# Patient Record
Sex: MALE | Race: WHITE
[De-identification: names, ages, dates, MRNs, and addresses within clinical notes are randomized per-mention and may not be internally consistent; named-entity substitution may affect disease eponyms.]

---

## 2020-08-16 ENCOUNTER — HOSPITAL ENCOUNTER (EMERGENCY)
Dept: HOSPITAL 50 - VM.ED | Age: 30
Discharge: HOME | End: 2020-08-16
Payer: COMMERCIAL

## 2020-08-16 DIAGNOSIS — F17.210: ICD-10-CM

## 2020-08-16 DIAGNOSIS — Z91.09: ICD-10-CM

## 2020-08-16 DIAGNOSIS — I10: ICD-10-CM

## 2020-08-16 DIAGNOSIS — K42.9: Primary | ICD-10-CM

## 2020-08-16 DIAGNOSIS — Z79.899: ICD-10-CM

## 2020-08-16 LAB
ANION GAP SERPL CALC-SCNC: 12.6 MMOL/L (ref 10–20)
CHLORIDE SERPL-SCNC: 104 MMOL/L (ref 98–107)
SODIUM SERPL-SCNC: 139 MMOL/L (ref 136–145)

## 2020-08-16 NOTE — EDM.PDOC
ED HPI GENERAL MEDICAL PROBLEM





- General


Chief Complaint: Abdominal Pain


Stated Complaint: STOMACH PAIN


Time Seen by Provider: 08/16/20 19:30


Source of Information: Reports: Patient


History Limitations: Reports: No Limitations





- History of Present Illness


INITIAL COMMENTS - FREE TEXT/NARRATIVE: 





Patient comes emergency department today from home with complaints of abdominal 

pain.  Since about 1:00 today the patient has had severe pain in the right side 

of his abdomen and his umbilical area.  He does have a known umbilical hernia 

that regularly protrudes.  Today when he was trying to get up from the chair he 

suddenly had quite a bit more pain in his abdomen and is unrelenting and has not

stopped.  No nausea or vomiting.  No diarrhea.  No hematuria dysuria or urinary 

frequency.  No fever no chills.  No COVID symptoms or cold exposure.


  ** Right Upper Abdominal


Pain Score (Numeric/FACES): 6





- Related Data


                                    Allergies











Allergy/AdvReac Type Severity Reaction Status Date / Time


 


cat dander Allergy  Rash Verified 08/16/20 19:50











Home Meds: 


                                    Home Meds





Cyclobenzaprine [Flexeril] 10 mg PO TID PRN 08/16/20 [History]


DULoxetine [Cymbalta] 60 mg PO DAILY 08/16/20 [History]


Losartan [Cozaar] 50 mg PO DAILY 08/16/20 [History]


busPIRone HCl [Buspirone HCl] 15 mg PO TID PRN 08/16/20 [History]











Past Medical History


Cardiovascular History: Reports: Hypertension


Gastrointestinal History: Reports: Other (See Below)


Other Gastrointestinal History: hernia to abdomen





Social & Family History





- Tobacco Use


Smoking Status *Q: Current Every Day Smoker


Years of Tobacco use: 10


Packs/Tins Daily: 1





- Recreational Drug Use


Recreational Drug Use: No





ED ROS GENERAL





- Review of Systems


Review Of Systems: Comprehensive ROS is negative, except as noted in HPI.





ED EXAM, GI/ABD





- Physical Exam


Exam: See Below


Exam Limited By: No Limitations


General Appearance: Alert, WD/WN, Moderate Distress


Eyes: Bilateral: EOMI


Ears: Normal External Exam


Nose: Normal Inspection


Throat/Mouth: Normal Inspection


Head: Atraumatic, Normocephalic


Neck: Normal Inspection


Respiratory/Chest: No Respiratory Distress, Lungs Clear, Normal Breath Sounds, 

No Accessory Muscle Use, Chest Non-Tender


Cardiovascular: Normal Peripheral Pulses, Regular Rate, Rhythm


GI/Abdominal Exam: Normal Bowel Sounds, Soft, Tender (He has generalized 

referred tenderness throughout the abdomen to his umbilicus region on 

palpation.), Hernia (He has exquisite point tenderness and a small soft 

umbilical hernia.  It is very tender on palpation.  Really does not feel like an

 incarcerated hernia with how soft it is but he is very tender in this region.  

There is no other hernias of the abdomen.)


 (Male) Exam: Deferred


Rectal (Males) Exam: Deferred


Back Exam: Normal Inspection, Full Range of Motion


Extremities: Normal Inspection, Normal Range of Motion


Neurological: Alert, Oriented, Normal Cognition, No Motor/Sensory Deficits


Psychiatric: Normal Affect, Normal Mood


Skin Exam: Warm, Dry, Intact, Normal Color





Course





- Vital Signs


Last Recorded V/S: 


                                Last Vital Signs











Temp  98.4 F   08/16/20 19:15


 


Pulse  88   08/16/20 20:04


 


Resp  16   08/16/20 19:15


 


BP  177/100 H  08/16/20 20:04


 


Pulse Ox  100   08/16/20 19:15














- Orders/Labs/Meds


Orders: 


                               Active Orders 24 hr











 Category Date Time Status


 


 Abdomen Pelvis w Cont [CT] Stat Exams  08/16/20 20:40 Taken


 


 Sodium Chloride 0.9% [Saline Flush] Med  08/16/20 20:03 Active





 10 ml FLUSH ASDIRECTED PRN   


 


 Peripheral IV Insertion Adult [OM.PC] Stat Oth  08/16/20 20:03 Ordered








                                Medication Orders





Sodium Chloride (Saline Flush)  10 ml FLUSH ASDIRECTED PRN


   PRN Reason: Keep Vein Open








Labs: 


                                Laboratory Tests











  08/16/20 08/16/20 08/16/20 Range/Units





  19:40 19:40 19:40 


 


WBC  11.8 H    (4.0-10.0)  x10^3/uL


 


RBC  5.30    (4.5-6.0)  x10^6/uL


 


Hgb  15.3    (14.0-18.0)  g/dL


 


Hct  43.4    (40.0-52.0)  %


 


MCV  81.9    (78.0-93.0)  fL


 


MCH  28.9    (26.0-32.0)  pg


 


MCHC  35.3    (32.0-36.0)  g/dL


 


RDW Coeff of Krystal  12.9    (10.0-15.0)  %


 


Plt Count  241    (130-400)  x10^3/uL


 


Neut % (Auto)  57.6    (50.0-80.0)  %


 


Lymph % (Auto)  29.1    (25.0-50.0)  %


 


Mono % (Auto)  9.6    (2.0-11.0)  %


 


Eos % (Auto)  3.1    (0.0-4.0)  %


 


Baso % (Auto)  0.6    (0.2-1.2)  %


 


Sodium   139   (136-145)  mmol/L


 


Potassium   3.6   (3.5-5.1)  mmol/L


 


Chloride   104   ()  mmol/L


 


Carbon Dioxide   26   (21-32)  mmol/L


 


Anion Gap   12.6   (10-20)  mmol/L


 


BUN   13   (7-18)  mg/dL


 


Creatinine   1.2   (0.70-1.30)  mg/dL


 


Est Cr Clr Drug Dosing   TNP   


 


Estimated GFR (MDRD)   > 60   


 


Glucose   142 H   ()  mg/dL


 


Lactic Acid    1.8  (0.4-2.0)  mmol/L


 


Calcium   9.0   (8.5-10.1)  mg/dL


 


Corrected Calcium   8.84   (8.5-10.1)  mg/dL


 


Total Bilirubin   0.6   (0.2-1.0)  mg/dL


 


AST   22   (15-37)  U/L


 


ALT   76 H   (16-63)  U/L


 


Alkaline Phosphatase   101   ()  U/L


 


C-Reactive Protein   0.5   (<=0.9)  mg/dL


 


Total Protein   7.4   (6.4-8.2)  g/dL


 


Albumin   4.2   (3.4-5.0)  g/dL


 


Globulin   3.2   


 


Albumin/Globulin Ratio   1.31   


 


Urine Color     (YELLOW)  


 


Urine Appearance     (CLEAR)  


 


Urine pH     (5.0-8.0)  


 


Ur Specific Gravity     


 


Urine Protein     (NEGATIVE)  mg/dL


 


Urine Glucose (UA)     (NEGATIVE)  mg/dL


 


Urine Ketones     (NEGATIVE)  mg/dL


 


Urine Occult Blood     (NEGATIVE)  


 


Urine Nitrite     (NEGATIVE)  


 


Urine Bilirubin     (NEGATIVE)  


 


Urine Urobilinogen     (0.2)  EU/dL


 


Ur Leukocyte Esterase     (NEGATIVE)  


 


Urine RBC     (NOT SEEN)  /HPF


 


Urine WBC     (NOT SEEN)  /HPF


 


Ur Squamous Epith Cells     (NEGATIVE)  /HPF


 


Urine Bacteria     (NEGATIVE)  /HPF


 


Urine Mucus     (NEGATIVE)  /LPF














  08/16/20 Range/Units





  19:50 


 


WBC   (4.0-10.0)  x10^3/uL


 


RBC   (4.5-6.0)  x10^6/uL


 


Hgb   (14.0-18.0)  g/dL


 


Hct   (40.0-52.0)  %


 


MCV   (78.0-93.0)  fL


 


MCH   (26.0-32.0)  pg


 


MCHC   (32.0-36.0)  g/dL


 


RDW Coeff of Krystal   (10.0-15.0)  %


 


Plt Count   (130-400)  x10^3/uL


 


Neut % (Auto)   (50.0-80.0)  %


 


Lymph % (Auto)   (25.0-50.0)  %


 


Mono % (Auto)   (2.0-11.0)  %


 


Eos % (Auto)   (0.0-4.0)  %


 


Baso % (Auto)   (0.2-1.2)  %


 


Sodium   (136-145)  mmol/L


 


Potassium   (3.5-5.1)  mmol/L


 


Chloride   ()  mmol/L


 


Carbon Dioxide   (21-32)  mmol/L


 


Anion Gap   (10-20)  mmol/L


 


BUN   (7-18)  mg/dL


 


Creatinine   (0.70-1.30)  mg/dL


 


Est Cr Clr Drug Dosing   


 


Estimated GFR (MDRD)   


 


Glucose   ()  mg/dL


 


Lactic Acid   (0.4-2.0)  mmol/L


 


Calcium   (8.5-10.1)  mg/dL


 


Corrected Calcium   (8.5-10.1)  mg/dL


 


Total Bilirubin   (0.2-1.0)  mg/dL


 


AST   (15-37)  U/L


 


ALT   (16-63)  U/L


 


Alkaline Phosphatase   ()  U/L


 


C-Reactive Protein   (<=0.9)  mg/dL


 


Total Protein   (6.4-8.2)  g/dL


 


Albumin   (3.4-5.0)  g/dL


 


Globulin   


 


Albumin/Globulin Ratio   


 


Urine Color  Dark yellow H  (YELLOW)  


 


Urine Appearance  Slightly cloudy H  (CLEAR)  


 


Urine pH  6.0  (5.0-8.0)  


 


Ur Specific Gravity  >=1.030  


 


Urine Protein  Trace H  (NEGATIVE)  mg/dL


 


Urine Glucose (UA)  Negative  (NEGATIVE)  mg/dL


 


Urine Ketones  Negative  (NEGATIVE)  mg/dL


 


Urine Occult Blood  Negative  (NEGATIVE)  


 


Urine Nitrite  Negative  (NEGATIVE)  


 


Urine Bilirubin  Negative  (NEGATIVE)  


 


Urine Urobilinogen  0.2  (0.2)  EU/dL


 


Ur Leukocyte Esterase  Negative  (NEGATIVE)  


 


Urine RBC  0-5  (NOT SEEN)  /HPF


 


Urine WBC  0-5  (NOT SEEN)  /HPF


 


Ur Squamous Epith Cells  Not seen  (NEGATIVE)  /HPF


 


Urine Bacteria  Rare  (NEGATIVE)  /HPF


 


Urine Mucus  Few H  (NEGATIVE)  /LPF











Meds: 


Medications











Generic Name Dose Route Start Last Admin





  Trade Name Freq  PRN Reason Stop Dose Admin


 


Sodium Chloride  10 ml  08/16/20 20:03 





  Saline Flush  FLUSH  





  ASDIRECTED PRN  





  Keep Vein Open  














Discontinued Medications














Generic Name Dose Route Start Last Admin





  Trade Name Freq  PRN Reason Stop Dose Admin


 


Hydrocodone Bitart/Acetaminophen  1 packet  08/16/20 22:39 





  Take Home: Acetam/Hydrocodon 325-5 Mg, 5 Pack  PO  08/16/20 22:40 





  ONETIME ONE  


 


Diphenhydramine HCl  25 mg  08/16/20 20:02  08/16/20 20:12





  Benadryl  IVPUSH  08/16/20 20:03  25 mg





  ONETIME ONE   Administration


 


Fentanyl  100 mcg  08/16/20 20:02  08/16/20 20:12





  Sublimaze  IVPUSH  08/16/20 20:03  100 mcg





  ONETIME ONE   Administration


 


Fentanyl  100 mcg  08/16/20 20:31 





  Sublimaze  IVPUSH  08/16/20 20:32 





  ONETIME ONE  


 


Hydromorphone HCl  1 mg  08/16/20 22:25 





  Dilaudid  IVPUSH  08/16/20 22:26 





  ONETIME ONE  


 


Lactated Ringer's  1,000 mls @ 999 mls/hr  08/16/20 20:02  08/16/20 20:12





  Ringers, Lactated  IV  08/16/20 21:02  999 mls/hr





  ONETIME ONE   Administration


 


Iopamidol  100 ml  08/16/20 21:20  08/16/20 21:22





  Isovue-300 (61%)  IVPUSH  08/16/20 21:21  100 ml





  ONETIME ONE   Administration


 


Ketorolac Tromethamine  30 mg  08/16/20 22:11 





  Toradol  IM  08/16/20 22:12 





  ONETIME ONE  


 


Midazolam HCl  Confirm  08/16/20 20:47 





  Versed 1 Mg/Ml  Administered  08/16/20 20:48 





  Dose  





  2 mg  





  .ROUTE  





  .STK-MED ONE  


 


Midazolam HCl  2 mg  08/16/20 21:21 





  Versed 1 Mg/Ml  IVPUSH  08/16/20 21:22 





  NOW STA  














- Radiology Interpretation


Free Text/Narrative:: 





CT abdomen pelvis per radiology shows small fat-containing umbilical hernia.  No

 evidence of bowel containing hernia.  No bowel obstruction free air.  Fatty and

 enlarged liver





- Re-Assessments/Exams


Free Text/Narrative Re-Assessment/Exam: 





08/17/20 00:26


Had concerns for incarcerated hernia initially although it was very soft.  The 

patient was given a total of 200 mcg of fentanyl and 2 of Versed with good pain 

management.  He was placed in reverse Trendelenburg and ice on the hernia.  I 

was able to easily manually reduce the hernia although he had quite a bit more 

pain after the reduction as I held the hernia in place.  Immediately upon 

removing my finger the the hernia returns.  I attempted multiple times reduction

 that was actually much easier after the first time although it does not stay 

reduced.





CT scan initially reviewed extemporaneously by myself does show a small fat-

containing umbilical hernia.  Radiology report following confirms this.  He is s

till exquisitely tender over the hernia site although it is much smaller and 

easily reducible.  Due to his continued pain and discomfort I spoke with Dr. Olivier the surgeon on call at [facility] in [place].  He had the CT available with her 

at that time.  She agrees that this is a fat-containing umbilical hernia.  It is

 okay to discharge him home at this time this is most likely the sequelae of 

worsening hernial injury to the wall.  NSAIDs and pain management and follow-up 

with surgery.





Cussed the plan of care with the patient.  He already has an appointment with 

the surgeon in [place] to have this repaired.  Him home with NSAIDs and a 

short course of narcotics.  He is comfortable with this plan his questions are 

answered.





Departure





- Departure


Time of Disposition: 22:35


Disposition: Home, Self-Care 01


Clinical Impression: 


Umbilical hernia


Qualifiers:


 Obstruction and gangrene presence: without obstruction or gangrene Qualified 

Code(s): K42.9 - Umbilical hernia without obstruction or gangrene








- Discharge Information


Instructions:  Umbilical Hernia, Adult, Pain Medicine Instructions, Easy-to-Read


Referrals: 


Luis Eduardo Dennis MD [Primary Care Provider] - 


Forms:  ED Department Discharge, ED Return to Work/School Form


Additional Instructions: 


Ibuprofen as needed for pain. 


OTC Omeprazole or Zantac to protect your stomach. 


No lifting


Take it easy the next few days. 


See Dr. Lemons at [facility] as planned for possible repair. You can 

contact them tomorrow to see if you can see him sooner. 


Try not to lift or strain to prevent further problems. 


If pain not controlled with above. Norco, 1 tablet every 4 hrs with food as 

needed for pain. Caution sedation. Starter pack given from the ED. 


Return to the ED if new or worsening symptoms. 





Sepsis Event Note (ED)





- Evaluation


Sepsis Screening Result: No Definite Risk





- Focused Exam


Vital Signs: 


                                   Vital Signs











  Temp Pulse Resp BP Pulse Ox


 


 08/16/20 20:04   88   177/100 H 


 


 08/16/20 19:15  98.4 F  94  16  166/111 H  100














- My Orders


Last 24 Hours: 


My Active Orders





08/16/20 20:03


Sodium Chloride 0.9% [Saline Flush]   10 ml FLUSH ASDIRECTED PRN 


Peripheral IV Insertion Adult [OM.PC] Stat 





08/16/20 20:40


Abdomen Pelvis w Cont [CT] Stat 














- Assessment/Plan


Last 24 Hours: 


My Active Orders





08/16/20 20:03


Sodium Chloride 0.9% [Saline Flush]   10 ml FLUSH ASDIRECTED PRN 


Peripheral IV Insertion Adult [OM.PC] Stat 





08/16/20 20:40


Abdomen Pelvis w Cont [CT] Stat

## 2020-08-17 NOTE — CT
______________________________________________________________________________   

  

[id number] CT/CT Abdomen Pelvis W IV  

EXAM: ABDOMEN AND PELVIS CT WITH CONTRAST  

   

 INDICATION: Possible incarcerated hernia.  

   

 COMPARISON: None.  

   

 DISCUSSION: Fat-containing umbilical hernia with a 17 mm orifice. No bowel  

 containing hernia, inguinal hernia or other hernias are identified.  

   

 Scattered diverticula of the colon without evidence of diverticulitis. Surgical  

 changes of appendectomy. Mildly prominent stool in the proximal colon.  

   

 Borderline wall thickening the proximal small bowel study be from lack of  

 distention. The small bowel is otherwise normal in appearance.  

   

 The liver is enlarged and demonstrates fatty infiltration. The gallbladder is  

 decompressed.  

   

 Mild splenomegaly.  

   

 Mild striation was described of the nephrograms on the preliminary report, but  

 are not definitely appreciated by this review. Correlation with urinalysis would  

 be useful to further exclude evidence of urinary tract infection.  

   

 The pancreas and adrenal glands are normal in appearance. No adenopathy, free  

 air free fluid.  

   

 The osseous structures are unremarkable.  

   

 IMPRESSION:    

 1.  Small fat-containing umbilical hernia. No bowel containing hernias are  

 identified.  

 2.  The liver is enlarged and demonstrates fatty infiltration.  

   

 Electronically signed by Henry Patel MD on 8/17/2020 7:46 AM  

   

  

Henry Patel MD                 

 08/17/20 0748    

  

Thank you for allowing us to participate in the care of your patient.